# Patient Record
Sex: MALE | Race: WHITE | NOT HISPANIC OR LATINO | Employment: FULL TIME | URBAN - METROPOLITAN AREA
[De-identification: names, ages, dates, MRNs, and addresses within clinical notes are randomized per-mention and may not be internally consistent; named-entity substitution may affect disease eponyms.]

---

## 2018-07-17 ENCOUNTER — TELEPHONE (OUTPATIENT)
Dept: INFUSION THERAPY | Facility: CLINIC | Age: 63
End: 2018-07-17

## 2018-07-17 DIAGNOSIS — D50.9 IRON DEFICIENCY ANEMIA: Primary | ICD-10-CM

## 2018-07-17 NOTE — TELEPHONE ENCOUNTER
Left voicemail message for patient requesting a return call regarding scheduling appointment. NEW ORDER

## 2018-07-23 ENCOUNTER — INFUSION THERAPY VISIT (OUTPATIENT)
Dept: INFUSION THERAPY | Facility: CLINIC | Age: 63
End: 2018-07-23
Attending: FAMILY MEDICINE
Payer: COMMERCIAL

## 2018-07-23 VITALS
HEART RATE: 84 BPM | RESPIRATION RATE: 16 BRPM | TEMPERATURE: 98.2 F | DIASTOLIC BLOOD PRESSURE: 84 MMHG | SYSTOLIC BLOOD PRESSURE: 124 MMHG

## 2018-07-23 DIAGNOSIS — D50.9 IRON DEFICIENCY ANEMIA: Primary | ICD-10-CM

## 2018-07-23 PROCEDURE — 25000128 H RX IP 250 OP 636: Performed by: PHYSICIAN ASSISTANT

## 2018-07-23 PROCEDURE — 96374 THER/PROPH/DIAG INJ IV PUSH: CPT

## 2018-07-23 RX ADMIN — SODIUM CHLORIDE 250 ML: 9 INJECTION, SOLUTION INTRAVENOUS at 14:31

## 2018-07-23 RX ADMIN — FERUMOXYTOL 510 MG: 510 INJECTION INTRAVENOUS at 14:31

## 2018-07-23 NOTE — PROGRESS NOTES
Infusion Nursing Note:  Paulo Her presents today for Roccoshay.    Patient seen by provider today: No   present during visit today: Not Applicable.    Note: N/A.    Intravenous Access:  Peripheral IV placed.    Treatment Conditions:  Not Applicable.      Post Infusion Assessment:  Patient tolerated infusion without incident.  Site patent and intact, free from redness, edema or discomfort.  No evidence of extravasations.  Access discontinued per protocol.    Discharge Plan:   Discharge instructions reviewed with: Patient.  Patient and/or family verbalized understanding of discharge instructions and all questions answered.  Copy of AVS reviewed with patient and/or family.  Patient will return next Monday for next appointment.  Patient discharged in stable condition accompanied by: self.  Departure Mode: Ambulatory.    Dafne Matos RN

## 2018-07-23 NOTE — MR AVS SNAPSHOT
After Visit Summary   7/23/2018    Paulo Her    MRN: [id number]           Patient Information     Date Of Birth          1955        Visit Information        Provider Department      7/23/2018 2:00 PM SH INFUSION CHAIR 20 [facility]        Today's Diagnoses     Iron deficiency anemia    -  1       Follow-ups after your visit        Your next 10 appointments already scheduled     Jul 30, 2018  3:00 PM CDT   Level 1 with  INFUSION CHAIR 1   [facility] ([facility])    [facility]  [address]  [place] [id number] [phone]              Who to contact     If you have questions or need follow up information about today's clinic visit or your schedule please contact [facility] directly at [phone].  Normal or non-critical lab and imaging results will be communicated to you by MyChart, letter or phone within 4 business days after the clinic has received the results. If you do not hear from us within 7 days, please contact the clinic through MyChart or phone. If you have a critical or abnormal lab result, we will notify you by phone as soon as possible.  Submit refill requests through [email or web address] or call your pharmacy and they will forward the refill request to us. Please allow 3 business days for your refill to be completed.          Additional Information About Your Visit        Care EveryWhere ID     This is your Care EveryWhere ID. This could be used by other organizations to access your [facility] medical records  [id number]        Your Vitals Were     Pulse Temperature Respirations             84 98.2  F (36.8  C) (Oral) 16          Blood Pressure from Last 3 Encounters:   07/23/18 124/84   07/12/15 (!) 150/94   01/28/14 (!) 133/32    Weight from Last 3 Encounters:   05/08/12 83.9 kg (185 lb)   07/05/10 81.6 kg (180 lb)              Today,  you had the following     No orders found for display         Today's Medication Changes          These changes are accurate as of 7/23/18  3:22 PM.  If you have any questions, ask your nurse or doctor.               These medicines have changed or have updated prescriptions.        Dose/Directions    XANAX PO   This may have changed:  Another medication with the same name was removed. Continue taking this medication, and follow the directions you see here.        Dose:  0.25 mg   Take 0.25 mg by mouth daily   Refills:  0         Stop taking these medicines if you haven't already. Please contact your care team if you have questions.     METOPROLOL SUCCINATE PO                    Primary Care Provider Office Phone # Fax #    Nick Rush [phone]       [facility] [address]  [facility] [address]        Equal Access to Services     RAYNE CATES : Maulik bucko Somckayla, waaxda luqadaha, qaybta kaalmada adeegyajoseph, donnie smith . So [facility] [phone].    ATENCIÓN: Si habla español, tiene a dallas disposición servicios gratuitos de asistencia lingüística. [facility] [phone].    We comply with applicable federal civil rights laws and [place] laws. We do not discriminate on the basis of race, color, national origin, age, disability, sex, sexual orientation, or gender identity.            Thank you!     Thank you for choosing [facility]  for your care. Our goal is always to provide you with excellent care. Hearing back from our patients is one way we can continue to improve our services. Please take a few minutes to complete the written survey that you may receive in the mail after your visit with us. Thank you!             Your Updated Medication List - Protect others around you: Learn how to safely use, store and throw away your medicines at www.disposemymeds.org.          This list is accurate as of 7/23/18   3:22 PM.  Always use your most recent med list.                   Brand Name Dispense Instructions for use Diagnosis    CRESTOR PO      Take 5 mg by mouth daily Pt takes 3x per week        IRON SUPPLEMENT PO      Take 325 mg by mouth daily        LOSARTAN POTASSIUM PO      Take 12.5 mg by mouth daily        METHOCARBAMOL PO      Take 200 mg by mouth as needed for muscle spasms        METOPROLOL TARTRATE PO      Take 12.5 mg by mouth 2 times daily        milk thistle extract 140 MG Caps capsule      Take by mouth daily        omeprazole 10 MG CR capsule    priLOSEC     as needed        SIMETHICONE-80 PO      Take 80 mg by mouth 4 times daily as needed for intestinal gas        TOUJEO SOLOSTAR 300 UNIT/ML injection   Generic drug:  insulin glargine U-300      Inject 18 Units Subcutaneous At Bedtime        TRULICITY SC      Inject Subcutaneous once a week        VICODIN 5-500 MG per tablet   Generic drug:  HYDROcodone-acetaminophen      as needed        VITAMIN D (CHOLECALCIFEROL) PO      Take 1,000 Units by mouth daily        WELLBUTRIN PO      Take 50 mg by mouth daily        XANAX PO      Take 0.25 mg by mouth daily        ZOFRAN 4 MG tablet   Generic drug:  ondansetron      as needed

## 2018-07-27 ENCOUNTER — TELEPHONE (OUTPATIENT)
Dept: INFUSION THERAPY | Facility: CLINIC | Age: 63
End: 2018-07-27

## 2018-07-27 NOTE — TELEPHONE ENCOUNTER
Message left for patient Re: need new insurance information for upcoming infusion.. bcbs listed in epic is invalid

## 2018-07-30 ENCOUNTER — INFUSION THERAPY VISIT (OUTPATIENT)
Dept: INFUSION THERAPY | Facility: CLINIC | Age: 63
End: 2018-07-30
Attending: FAMILY MEDICINE
Payer: COMMERCIAL

## 2018-07-30 VITALS — TEMPERATURE: 97.9 F | DIASTOLIC BLOOD PRESSURE: 88 MMHG | RESPIRATION RATE: 16 BRPM | SYSTOLIC BLOOD PRESSURE: 150 MMHG

## 2018-07-30 DIAGNOSIS — D50.9 IRON DEFICIENCY ANEMIA: Primary | ICD-10-CM

## 2018-07-30 PROCEDURE — 96365 THER/PROPH/DIAG IV INF INIT: CPT

## 2018-07-30 PROCEDURE — 25000128 H RX IP 250 OP 636: Performed by: PHYSICIAN ASSISTANT

## 2018-07-30 RX ADMIN — FERUMOXYTOL 510 MG: 510 INJECTION INTRAVENOUS at 15:21

## 2018-07-30 RX ADMIN — SODIUM CHLORIDE 250 ML: 9 INJECTION, SOLUTION INTRAVENOUS at 15:21

## 2018-07-30 NOTE — PROGRESS NOTES
Infusion Nursing Note:  Paulo Her presents today for Feraheme.    Patient seen by provider today: No   present during visit today: Not Applicable.    Note: N/A.    Intravenous Access:  Peripheral IV placed.    Treatment Conditions:  Not Applicable.      Post Infusion Assessment:  Patient tolerated infusion without incident.  Blood return noted pre and post infusion.  Site patent and intact, free from redness, edema or discomfort.  No evidence of extravasations.  Access discontinued per protocol.    Discharge Plan:   Patient declined prescription refills.  Discharge instructions reviewed with: Patient.  Patient verbalized understanding of discharge instructions and all questions answered.  Copy of AVS reviewed with patient.  Patient will return as scheduled for next appointment.  Patient discharged in stable condition accompanied by: self.  Departure Mode: Ambulatory.    Savanah Magana RN

## 2019-10-22 ENCOUNTER — TRANSFERRED RECORDS (OUTPATIENT)
Dept: HEALTH INFORMATION MANAGEMENT | Facility: CLINIC | Age: 64
End: 2019-10-22

## 2019-10-31 RX ORDER — HEPARIN SODIUM (PORCINE) LOCK FLUSH IV SOLN 100 UNIT/ML 100 UNIT/ML
5 SOLUTION INTRAVENOUS
Status: CANCELLED | OUTPATIENT
Start: 2019-11-04

## 2019-10-31 RX ORDER — HEPARIN SODIUM,PORCINE 10 UNIT/ML
5 VIAL (ML) INTRAVENOUS
Status: CANCELLED | OUTPATIENT
Start: 2019-11-04

## 2019-11-04 ENCOUNTER — INFUSION THERAPY VISIT (OUTPATIENT)
Dept: INFUSION THERAPY | Facility: CLINIC | Age: 64
End: 2019-11-04
Attending: FAMILY MEDICINE
Payer: COMMERCIAL

## 2019-11-04 VITALS
RESPIRATION RATE: 18 BRPM | DIASTOLIC BLOOD PRESSURE: 78 MMHG | TEMPERATURE: 97.8 F | HEART RATE: 96 BPM | SYSTOLIC BLOOD PRESSURE: 124 MMHG

## 2019-11-04 DIAGNOSIS — D50.9 IRON DEFICIENCY ANEMIA: Primary | ICD-10-CM

## 2019-11-04 PROCEDURE — 25800030 ZZH RX IP 258 OP 636: Performed by: PHYSICIAN ASSISTANT

## 2019-11-04 PROCEDURE — 25000128 H RX IP 250 OP 636: Performed by: PHYSICIAN ASSISTANT

## 2019-11-04 PROCEDURE — 96365 THER/PROPH/DIAG IV INF INIT: CPT

## 2019-11-04 RX ORDER — HEPARIN SODIUM,PORCINE 10 UNIT/ML
5 VIAL (ML) INTRAVENOUS
Status: CANCELLED | OUTPATIENT
Start: 2019-11-11

## 2019-11-04 RX ORDER — HEPARIN SODIUM (PORCINE) LOCK FLUSH IV SOLN 100 UNIT/ML 100 UNIT/ML
5 SOLUTION INTRAVENOUS
Status: CANCELLED | OUTPATIENT
Start: 2019-11-11

## 2019-11-04 RX ADMIN — FERUMOXYTOL 510 MG: 510 INJECTION INTRAVENOUS at 14:16

## 2019-11-04 ASSESSMENT — PAIN SCALES - GENERAL: PAINLEVEL: NO PAIN (0)

## 2019-11-04 NOTE — PROGRESS NOTES
Infusion Nursing Note:  Paulo Her presents today for feraheme.    Patient seen by provider today: No   present during visit today: Not Applicable.    Note: N/A.    Intravenous Access:  Peripheral IV placed.    Treatment Conditions:  Not Applicable.      Post Infusion Assessment:  Patient tolerated infusion without incident.  Blood return noted pre and post infusion.  Site patent and intact, free from redness, edema or discomfort.  No evidence of extravasations.  Access discontinued per protocol.       Discharge Plan:   Discharge instructions reviewed with: Patient.  Patient and/or family verbalized understanding of discharge instructions and all questions answered.  Patient discharged in stable condition accompanied by: self.  Departure Mode: Ambulatory.    Misty Lobato RN

## 2019-11-11 ENCOUNTER — INFUSION THERAPY VISIT (OUTPATIENT)
Dept: INFUSION THERAPY | Facility: CLINIC | Age: 64
End: 2019-11-11
Attending: FAMILY MEDICINE
Payer: COMMERCIAL

## 2019-11-11 VITALS — SYSTOLIC BLOOD PRESSURE: 123 MMHG | HEART RATE: 83 BPM | DIASTOLIC BLOOD PRESSURE: 83 MMHG | RESPIRATION RATE: 16 BRPM

## 2019-11-11 DIAGNOSIS — D50.9 IRON DEFICIENCY ANEMIA: Primary | ICD-10-CM

## 2019-11-11 PROCEDURE — 25000128 H RX IP 250 OP 636: Performed by: PHYSICIAN ASSISTANT

## 2019-11-11 PROCEDURE — 25800030 ZZH RX IP 258 OP 636: Performed by: PHYSICIAN ASSISTANT

## 2019-11-11 PROCEDURE — 96365 THER/PROPH/DIAG IV INF INIT: CPT

## 2019-11-11 RX ORDER — HEPARIN SODIUM,PORCINE 10 UNIT/ML
5 VIAL (ML) INTRAVENOUS
Status: CANCELLED | OUTPATIENT
Start: 2019-11-18

## 2019-11-11 RX ORDER — HEPARIN SODIUM (PORCINE) LOCK FLUSH IV SOLN 100 UNIT/ML 100 UNIT/ML
5 SOLUTION INTRAVENOUS
Status: CANCELLED | OUTPATIENT
Start: 2019-11-18

## 2019-11-11 RX ADMIN — SODIUM CHLORIDE 250 ML: 9 INJECTION, SOLUTION INTRAVENOUS at 14:22

## 2019-11-11 RX ADMIN — FERUMOXYTOL 510 MG: 510 INJECTION INTRAVENOUS at 14:22

## 2019-11-11 NOTE — PROGRESS NOTES
Infusion Nursing Note:  Paulo Her presents today for Feraheme.    Patient seen by provider today: No   present during visit today: Not Applicable.    Note: N/A.    Intravenous Access:  Peripheral IV placed.    Treatment Conditions:  Not Applicable.      Post Infusion Assessment:  Patient tolerated infusion without incident.  Patient observed for 30 minutes post Feraheme per protocol.  Blood return noted pre and post infusion.  Site patent and intact, free from redness, edema or discomfort.  No evidence of extravasations.  Access discontinued per protocol.       Discharge Plan:   Discharge instructions reviewed with: Patient.  Patient and/or family verbalized understanding of discharge instructions and all questions answered.  Copy of AVS reviewed with patient and/or family.  Patient will return PRN for next appointment.  Patient discharged in stable condition accompanied by: self.  Departure Mode: Ambulatory.    Alfonzo Roman RN

## 2020-03-15 ENCOUNTER — HEALTH MAINTENANCE LETTER (OUTPATIENT)
Age: 65
End: 2020-03-15

## 2020-05-16 ENCOUNTER — APPOINTMENT (OUTPATIENT)
Dept: CT IMAGING | Facility: CLINIC | Age: 65
End: 2020-05-16
Attending: NURSE PRACTITIONER
Payer: MEDICARE

## 2020-05-16 ENCOUNTER — HOSPITAL ENCOUNTER (EMERGENCY)
Facility: CLINIC | Age: 65
Discharge: HOME | End: 2020-05-16
Attending: NURSE PRACTITIONER | Admitting: NURSE PRACTITIONER
Payer: MEDICARE

## 2020-05-16 ENCOUNTER — APPOINTMENT (OUTPATIENT)
Dept: GENERAL RADIOLOGY | Facility: CLINIC | Age: 65
End: 2020-05-16
Attending: NURSE PRACTITIONER
Payer: MEDICARE

## 2020-05-16 VITALS
BODY MASS INDEX: 30.1 KG/M2 | DIASTOLIC BLOOD PRESSURE: 98 MMHG | TEMPERATURE: 97 F | SYSTOLIC BLOOD PRESSURE: 146 MMHG | HEIGHT: 71 IN | OXYGEN SATURATION: 100 % | WEIGHT: 215 LBS | RESPIRATION RATE: 16 BRPM

## 2020-05-16 DIAGNOSIS — S09.90XA INJURY OF HEAD, INITIAL ENCOUNTER: ICD-10-CM

## 2020-05-16 DIAGNOSIS — S01.01XA LACERATION OF SCALP, INITIAL ENCOUNTER: ICD-10-CM
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
## 2020-05-16 DIAGNOSIS — M54.2 NECK PAIN: ICD-10-CM

## 2020-05-16 PROCEDURE — 70450 CT HEAD/BRAIN W/O DYE: CPT

## 2020-05-16 PROCEDURE — 99284 EMERGENCY DEPT VISIT MOD MDM: CPT | Mod: 25

## 2020-05-16 PROCEDURE — 12002 RPR S/N/AX/GEN/TRNK2.6-7.5CM: CPT

## 2020-05-16 PROCEDURE — 72040 X-RAY EXAM NECK SPINE 2-3 VW: CPT

## 2020-05-16 ASSESSMENT — ENCOUNTER SYMPTOMS
VOMITING: 0
ARTHRALGIAS: 1
MYALGIAS: 1
WOUND: 1

## 2020-05-16 ASSESSMENT — MIFFLIN-ST. JEOR: SCORE: 1782.36

## 2020-05-16 NOTE — DISCHARGE INSTRUCTIONS
Staples out in 7 days. Return for signs or symptoms of infection such as: fever, increased redness, heat to touch, increased pain or pus-like drainage.      Discharge Instructions  Head Injury    You have been seen today for a head injury. Your evaluation included a history and physical examination. You may have had a CT (CAT) scan performed, though most head injuries do not require a scan. Based on this evaluation, your provider today does not feel that your head injury is serious.    Generally, every Emergency Department visit should have a follow-up clinic visit with either a primary or a specialty clinic/provider. Please follow-up as instructed by your emergency provider today.  Return to the Emergency Department if:  You are confused or you are not acting right.  Your headache gets worse or you start to have a really bad headache even with your recommended treatment plan.  You vomit (throw up) more than once.  You have a seizure.  You have trouble walking.  You have weakness or paralysis (cannot move) in an arm or a leg.  You have blood or fluid coming from your ears or nose.  You have new symptoms or anything that worries you.    Sleeping:  It is okay for you to sleep, but someone should wake you up if instructed by your provider, and someone should check on you at your usual time to wake up.     Activity:  Do not drive for at least 24 hours.  Do not drive if you have dizzy spells or trouble concentrating, or remembering things.  Do not return to any contact sports until cleared by your regular provider.     MORE INFORMATION:    Concussion:  A concussion is a minor head injury that may cause temporary problems with the way the brain works. Although concussions are important, they are generally not an emergency or a reason that a person needs to be hospitalized. Some concussion symptoms include confusion, amnesia (forgetful), nausea (sick to your stomach) and vomiting (throwing up), dizziness, fatigue, memory  or concentration problems, irritability and sleep problems. For most people, concussions are mild and temporary but some will have more severe and persistent symptoms that require on-going care and treatment.  CT Scans: Your evaluation today may have included a CT scan (CAT scan) to look for things like bleeding or a skull fracture (broken bone).  CT scans involve radiation and too many CT scans can cause serious health problems like cancer, especially in children.  Because of this, your provider may not have ordered a CT scan today if they think you are at low risk for a serious or life threatening problem.    If you were given a prescription for medicine here today, be sure to read all of the information (including the package insert) that comes with your prescription.  This will include important information about the medicine, its side effects, and any warnings that you need to know about.  The pharmacist who fills the prescription can provide more information and answer questions you may have about the medicine.  If you have questions or concerns that the pharmacist cannot address, please call or return to the Emergency Department.     Remember that you can always come back to the Emergency Department if you are not able to see your regular provider in the amount of time listed above, if you get any new symptoms, or if there is anything that worries you.

## 2020-05-16 NOTE — ED NOTES
Patient had midline neck tenderness when assessing in triage.  Attempted to apply c collar, patient refused due to pain.  Explained to patient in length the importance of the c collar and could provide a gauze over patient's wound on posterior head.  Patient again refused c collar.

## 2020-05-16 NOTE — ED AVS SNAPSHOT
Emergency Department  [address] [facility] [address]  Phone:  [phone]  Fax:  [phone]                                    Paulo Her   MRN: [id number]    Department:   Emergency Department   Date of Visit:  5/16/2020           After Visit Summary Signature Page    I have received my discharge instructions, and my questions have been answered. I have discussed any challenges I see with this plan with the nurse or doctor.    ..........................................................................................................................................  Patient/Patient Representative Signature      ..........................................................................................................................................  Patient Representative Print Name and Relationship to Patient    ..................................................               ................................................  Date                                   Time    ..........................................................................................................................................  Reviewed by Signature/Title    ...................................................              ..............................................  Date                                               Time          22EPIC Rev 08/18

## 2020-05-16 NOTE — ED TRIAGE NOTES
Fell approx 5 feet off a ladder.  States head scraped against the brick as he fell.  Bleeding to back of head.  Unsure which side he landed on.  Ambulatory into ED.  No thinners.  No LOC.  States feeling dizzy and vision seems blurry.

## 2020-05-16 NOTE — ED PROVIDER NOTES
"  History     Chief Complaint:  Fall      HPI   Paulo Her is a 65 year old male who presents for the evaluation of fall. The patient reports that just prior to his arrival to the ED he was working on his house and fell five feet to the ground off a ladder landing on his back. The patient describes that one of the legs of the ladder gave way causing him to fall and that he also hit the back of his head on the brick window ledge on the exterior of his house. He notes that he is experiencing worsened neck pain from his baseline and a sensation of soreness in his hands that he describes as feeling similar to a sprain. He states that he has a history of neck pain and has received steroid injections for this in the past. Patient also endorses his vision being \"a little off,\" but denies blurred and double vision. No swelling, bruising, or deformity of the hands or wrist. No pain in the hips knees, elbows, wrists, shoulders. Patient does have a cute to the back of his scalp. The patient denies syncope, vomiting, and other issues. Tetanus is up to date. 1/31/2019.     Allergies:  Cipro  Penicillin G     Medications:    Xanax  Wellbutrin  Trulicity  Losartan  Methocarbamol  Metoprolol  Omeprazole  Crestor  Simethicone  Vitamin D    Past Medical History:    Iron deficiency anemia   Type II diabetes  Aspirin intolerance  RBBB  Cervical radiculopathy  Parotiditis  Chronic abdominal pain  GERD  Bronchitis  Arteriosclerotic cardiovascular disease  Hypertension  Cirrhosis  Hepatitis C  Anxiety     Past Surgical History:    History reviewed. No pertinent surgical history.     Family History:    History reviewed. No pertinent family history.      Social History:  Presents to the ED via ambulance.   Smoking status: Current every day smoker, PPD: 0.25  Alcohol use: Yes  Drug use: No  PCP: Nick Rush   Marital Status:   [2]     Review of Systems   Gastrointestinal: Negative for vomiting.   Musculoskeletal: " "Positive for arthralgias and myalgias.   Skin: Positive for wound.   Neurological: Negative for syncope.   All other systems reviewed and are negative.        Physical Exam     Patient Vitals for the past 24 hrs:   BP Temp Temp src Heart Rate Resp SpO2 Height Weight   05/16/20 1424 (!) 146/98 97  F (36.1  C) Temporal 88 14 96 % 1.803 m (5' 11\") 97.5 kg (215 lb)      Physical Exam  Physical Exam   Constitutional: Pt appears well-developed and well-nourished.  Head: Posterior scalp laceration. Head moves freely with normal range of motion. No battles signs. No Racoons eyes.   ENT: Oropharynx is clear and moist. Nose with no deformity. No hemotympanum.  Eyes: Conjunctivae pink. EOMs intact. Pupils are equal, round, and reactive to light.  Neck: Normal range of motion. Mild midline C Spine tenderness, no step-off or crepitus. No edema.   Cardiovascular: Regular rate and rhythm. Normal heart sounds. No concerning  murmur heard. Intact distal pulses: radial pulses 2+ on the right, 2+ on the left.   Pulmonary/Chest: No respiratory distress.  Breath sounds normal. No decreased breath sounds. No wheezes. No rhonchi. No rales. No chest wall tenderness or crepitus.    Abdominal: Soft. Non-tender. No rebound, no guarding.   Musculoskeletal: No edema. No tenderness. Distal capillary refill and sensation intact. No swelling, bruising, or deformity of the hands or wrist. He notes mild tenderness to bilateral palms of the hands.   Neurological: Oriented to person, place, and time. No focal deficits.   Skin: Skin is warm. 4 cm laceration the back of the scalp.     Emergency Department Course   Imaging:  Radiographic findings were communicated with the patient who voiced understanding of the findings.  Cervical Spine XR, 2-3 Views  IMPRESSION: There is normal alignment of the cervical vertebrae;  however, there is straightening of normal cervical lordosis. Vertebral  body heights of the cervical spine are normal. " Craniocervical  alignment is normal. There is no evidence for fracture of the cervical  spine. There is degenerative endplate spurring at the C5-C6 and C6-C7  levels. There is no prevertebral soft tissue swelling.  As read by Radiology.    Head CT w/o Contrast   IMPRESSION: Diffuse cerebral volume loss and cerebral white matter  changes consistent with chronic small vessel ischemic disease. No  evidence for acute intracranial pathology.  As read by Radiology.    Procedures:    Laceration Repair        LACERATION:  A simple clean 4 cm laceration.      LOCATION:  Back of scalp       FUNCTION:  Distally sensation are intact.      ANESTHESIA:  Local using 1% lidocaine with Epi total of 3 mLs      PREPARATION:  Irrigation with Normal Saline and Shur Clens      DEBRIDEMENT:  no debridement      CLOSURE:  Wound was closed with 5 Staples    Emergency Department Course:  Past medical records, nursing notes, and vitals reviewed.  1440: I performed an exam of the patient and obtained history, as documented above.     IV inserted and blood drawn.     The patient was sent for a head CT and cervical spine x ray while in the emergency department, findings above.    1546: I rechecked the patient. Explained findings to patient. Patient treated with local anesthesia.     1630: I rechecked the patient. Explained findings to patient. Laceration repair procedure performed.     Findings and plan explained to the Patient. Patient discharged home with instructions regarding supportive care, medications, and reasons to return. The importance of close follow-up was reviewed.       Impression & Plan    Medical Decision Making:  Paulo Her is a 65 year old male who fell off a ladder about 5 feet after a leg on the ladder failed. No LOC. No emesis. Normal neurologic exam. He is not anticoagulated. Head CT is negative. C spine Xray is negative. No extremity deformity. Ambulates without pain. Scalp laceration repaired with 5 staples. We  discussed wound care, head injury precautions and need for clinic follow up for recheck. He is amenable to plan.       Diagnosis:    ICD-10-CM    1. Injury of head, initial encounter  S09.90XA    2. Laceration of scalp, initial encounter  S01.01XA    3. Fall from ladder, initial encounter  W11.XXXA    4. Neck pain  M54.2     acute on chronic       Disposition:  Discharged to home.    Scribe Disclosure:  I, Paulo Quarles, am serving as a scribe at 2:34 PM on 5/16/2020 to document services personally performed by Radha Titus APRN CNP based on my observations and the provider's statements to me.      Paulo Quarles  5/16/2020    EMERGENCY DEPARTMENT       Radha Titus APRN CNP  05/16/20 2043

## 2021-03-14 ENCOUNTER — AMBULATORY - HEALTHEAST (OUTPATIENT)
Dept: SURGERY | Facility: AMBULATORY SURGERY CENTER | Age: 66
End: 2021-03-14

## 2021-03-14 DIAGNOSIS — Z11.59 ENCOUNTER FOR SCREENING FOR OTHER VIRAL DISEASES: ICD-10-CM

## 2021-04-03 DIAGNOSIS — Z11.59 ENCOUNTER FOR SCREENING FOR OTHER VIRAL DISEASES: Primary | ICD-10-CM

## 2021-04-05 DIAGNOSIS — Z11.59 ENCOUNTER FOR SCREENING FOR OTHER VIRAL DISEASES: ICD-10-CM

## 2021-04-05 LAB
SARS-COV-2 RNA RESP QL NAA+PROBE: NORMAL
SPECIMEN SOURCE: NORMAL

## 2021-04-05 PROCEDURE — U0005 INFEC AGEN DETEC AMPLI PROBE: HCPCS | Performed by: PHYSICAL MEDICINE & REHABILITATION

## 2021-04-05 PROCEDURE — U0003 INFECTIOUS AGENT DETECTION BY NUCLEIC ACID (DNA OR RNA); SEVERE ACUTE RESPIRATORY SYNDROME CORONAVIRUS 2 (SARS-COV-2) (CORONAVIRUS DISEASE [COVID-19]), AMPLIFIED PROBE TECHNIQUE, MAKING USE OF HIGH THROUGHPUT TECHNOLOGIES AS DESCRIBED BY CMS-2020-01-R: HCPCS | Performed by: PHYSICAL MEDICINE & REHABILITATION

## 2021-04-06 LAB
LABORATORY COMMENT REPORT: NORMAL
SARS-COV-2 RNA RESP QL NAA+PROBE: NEGATIVE
SPECIMEN SOURCE: NORMAL

## 2021-04-06 ASSESSMENT — MIFFLIN-ST. JEOR
SCORE: 1769.42
SCORE: 1769.42

## 2021-04-07 ENCOUNTER — COMMUNICATION - HEALTHEAST (OUTPATIENT)
Dept: SCHEDULING | Facility: CLINIC | Age: 66
End: 2021-04-07

## 2021-04-08 ENCOUNTER — SURGERY - HEALTHEAST (OUTPATIENT)
Dept: SURGERY | Facility: AMBULATORY SURGERY CENTER | Age: 66
End: 2021-04-08

## 2021-04-08 ENCOUNTER — HOSPITAL ENCOUNTER (OUTPATIENT)
Dept: SURGERY | Facility: AMBULATORY SURGERY CENTER | Age: 66
Discharge: HOME | End: 2021-04-08
Attending: PHYSICAL MEDICINE & REHABILITATION | Admitting: PHYSICAL MEDICINE & REHABILITATION
Payer: COMMERCIAL

## 2021-05-09 ENCOUNTER — HEALTH MAINTENANCE LETTER (OUTPATIENT)
Age: 66
End: 2021-05-09

## 2021-06-05 VITALS
WEIGHT: 215 LBS | HEIGHT: 71 IN | HEIGHT: 71 IN | BODY MASS INDEX: 30.1 KG/M2 | WEIGHT: 215 LBS | BODY MASS INDEX: 30.1 KG/M2

## 2021-06-16 NOTE — PROCEDURES
"CERVICAL TRANSFORAMINAL EPIDURAL STEROID INJECTIONS WITH FLUOROSCOPIC GUIDANCE      Pre Procedure Diagnosis:  Neck pain, Cervical radiculitis  Post Procedure Diagnosis:  Same  Procedure Performed: Cervical Transforaminal Epidural Steroid Injection with Fluoroscopic Guidance  Clinical Scenario:  As per office notes  Anesthesia/Fluids:  As per intra-procedure documentation  Vital Signs:  As per intra-procedure documentation  Level Injected:  C6-C7  Side Injected:  Right  CC:        The patient has had other conservative treatment but wishes to pursue spine injections.  Alternative treatments to spine injections were discussed with the patient.  The procedure of cervical transforaminal epidural steroid injection was discussed in detail, using a spine model to demonstrate.  The patient had the opportunity to directly ask the physician questions regarding the procedure and the questions were answered prior to the consent form being presented.  The risks of the procedure, including but not limited to:  Neck pain/soreness, infection, bleeding, permanent nerve damage/injury, paralysis, stroke, allergic reaction,  worsening of neck/arm pain or no change in pain. The patient elected to proceed and signed informed consent.  His pain today was worse on the right side, hence right instead of left pursued.    The patient denies any symptoms of an active infection and denies taking antibiotics. The patient denies taking any prescription blood thinning medications. The patient denies any allergies to iodine or iodine contrast.     The patient was positioned in the supine position on the stretcher block.  A procedural pause was performed to verify patient identify, site of injection and side of injection.  The Right side of the neck was prepped in the usual sterile fashion.  The C-arm was positioned so that an anterior oblique view of the C6-C7 foramen was visualized.  A 3.5\", 25 guage spinal needle was inserted down to the " "posterior-inferior aspect of the foramen. The anterior foramen was avoided due to its proximity to the vertebral artery. Under AP visualization, the needle was advanced so it did not go beyond the \"6 o'clock\" position of the lateral masses. During needle advancement, the oblique (foraminal) view was also observed to ensure that the needle tip did not go towards the anterior aspect of the foramen.   Anterior views also were used to confirm placement.    After negative aspiration for blood, a 1 ml volume of Omnipaque-300 was injected and flow of contrast was noted to flow within the epidural space. Real-time standard and digital subtraction fluoroscopy was utilized and confirmed that no vascular uptake was seen.   A 1 mL solution of 1% lidocaine was injected as a test dose.  The patient did not experience any dysguesia, lightheadedness/dizziness, pain/paresthesias in the opposite upper extremity or in the legs.   One minute following the lidocaine test dose, the patient still reported no symptoms.  Subsequently, 1 mL of 15 mgs of Dexamethasone was injected.  The needle was removed.      The patient tolerated the procedure well.    After a period of observation, the patient was discharged.  The patient was instructed to call the spine clinic if any questions/concerns arise from the procedure.             "

## 2021-12-23 NOTE — MR AVS SNAPSHOT
After Visit Summary   7/30/2018    Paulo Her    MRN: [id number]           Patient Information     Date Of Birth          1955        Visit Information        Provider Department      7/30/2018 3:00 PM  INFUSION CHAIR 1 [facility]        Today's Diagnoses     Iron deficiency anemia    -  1       Follow-ups after your visit        Who to contact     If you have questions or need follow up information about today's clinic visit or your schedule please contact [facility] directly at [phone].  Normal or non-critical lab and imaging results will be communicated to you by MyChart, letter or phone within 4 business days after the clinic has received the results. If you do not hear from us within 7 days, please contact the clinic through MyChart or phone. If you have a critical or abnormal lab result, we will notify you by phone as soon as possible.  Submit refill requests through [employer] or call your pharmacy and they will forward the refill request to us. Please allow 3 business days for your refill to be completed.          Additional Information About Your Visit        Care EveryWhere ID     This is your Care EveryWhere ID. This could be used by other organizations to access your [place] medical records  [id number]        Your Vitals Were     Temperature Respirations                97.9  F (36.6  C) (Oral) 16           Blood Pressure from Last 3 Encounters:   07/30/18 150/88   07/23/18 124/84   07/12/15 (!) 150/94    Weight from Last 3 Encounters:   05/08/12 83.9 kg (185 lb)   07/05/10 81.6 kg (180 lb)              Today, you had the following     No orders found for display       Primary Care Provider Office Phone # Fax #    Nick Rush -[phone]       [facility] [address] [place] [address]        Equal Access to Services     RAYNE CATES AH: Maulik Pierce,  "Florence Gaming PA-C        Edward Curtis is a 80 waolegda lujanetteeliezer, qaybta kawiley hill, donnie gasparkeri ah. So [facility] [phone].    ATENCIÓN: Si jose ela stella, tiene a dallas disposición servicios gratuitos de asistencia lingüística. Deonna al [phone].    We comply with applicable federal civil rights laws and [place] laws. We do not discriminate on the basis of race, color, national origin, age, disability, sex, sexual orientation, or gender identity.            Thank you!     Thank you for choosing [facility]  for your care. Our goal is always to provide you with excellent care. Hearing back from our patients is one way we can continue to improve our services. Please take a few minutes to complete the written survey that you may receive in the mail after your visit with us. Thank you!             Your Updated Medication List - Protect others around you: Learn how to safely use, store and throw away your medicines at www.disposemymeds.org.          This list is accurate as of 7/30/18  3:51 PM.  Always use your most recent med list.                   Brand Name Dispense Instructions for use Diagnosis    CRESTOR PO      Take 5 mg by mouth daily Pt takes 3x per week        IRON SUPPLEMENT PO      Take 325 mg by mouth daily        LOSARTAN POTASSIUM PO      Take 12.5 mg by mouth daily        METHOCARBAMOL PO      Take 200 mg by mouth as needed for muscle spasms        METOPROLOL TARTRATE PO      Take 12.5 mg by mouth 2 times daily        milk thistle extract 140 MG Caps capsule      Take by mouth daily        omeprazole 10 MG CR capsule    priLOSEC     as needed        SIMETHICONE-80 PO      Take 80 mg by mouth 4 times daily as needed for intestinal gas        TOUJEO SOLOSTAR 300 UNIT/ML injection   Generic drug:  insulin glargine U-300      Inject 18 Units Subcutaneous At Bedtime        TRULICITY SC      Inject Subcutaneous once a week        VICODIN 5-500 MG per tablet   Generic drug:   HYDROcodone-acetaminophen      as needed        VITAMIN D (CHOLECALCIFEROL) PO      Take 1,000 Units by mouth daily        WELLBUTRIN PO      Take 50 mg by mouth daily        XANAX PO      Take 0.25 mg by mouth daily        ZOFRAN 4 MG tablet   Generic drug:  ondansetron      as needed

## 2022-02-02 ENCOUNTER — TELEPHONE (OUTPATIENT)
Dept: UROLOGY | Facility: CLINIC | Age: 67
End: 2022-02-02
Payer: COMMERCIAL

## 2022-02-02 NOTE — TELEPHONE ENCOUNTER
CAROLM for patient to schedule a Video visit (new) on 2-9 at 2:00 with Dr. Valdez for cyst on kidney. Okay to schedule.

## 2022-02-03 ENCOUNTER — TELEPHONE (OUTPATIENT)
Dept: UROLOGY | Facility: CLINIC | Age: 67
End: 2022-02-03
Payer: COMMERCIAL

## 2022-02-03 NOTE — TELEPHONE ENCOUNTER
LVM for patient to schedule a New video visit with Dr. Valdez on 2-9 at 2:00. Okay to schedule per Tamra.

## 2022-06-05 ENCOUNTER — HEALTH MAINTENANCE LETTER (OUTPATIENT)
Age: 67
End: 2022-06-05

## 2022-10-15 ENCOUNTER — HEALTH MAINTENANCE LETTER (OUTPATIENT)
Age: 67
End: 2022-10-15

## 2022-11-02 ENCOUNTER — ANCILLARY PROCEDURE (OUTPATIENT)
Dept: CT IMAGING | Facility: CLINIC | Age: 67
End: 2022-11-02
Attending: INTERNAL MEDICINE
Payer: COMMERCIAL

## 2022-11-02 DIAGNOSIS — R93.89 ABNORMAL FINDING ON IMAGING: ICD-10-CM

## 2022-11-02 LAB
CREAT BLD-MCNC: 1.3 MG/DL (ref 0.7–1.3)
GFR SERPL CREATININE-BSD FRML MDRD: 60 ML/MIN/1.73M2

## 2022-11-02 PROCEDURE — 255N000002 HC RX 255 OP 636: Performed by: INTERNAL MEDICINE

## 2022-11-02 PROCEDURE — 74177 CT ABD & PELVIS W/CONTRAST: CPT

## 2022-11-02 PROCEDURE — 82565 ASSAY OF CREATININE: CPT

## 2022-11-02 RX ADMIN — IOHEXOL 100 ML: 350 INJECTION, SOLUTION INTRAVENOUS at 12:13

## 2023-08-20 ENCOUNTER — HEALTH MAINTENANCE LETTER (OUTPATIENT)
Age: 68
End: 2023-08-20

## 2024-08-04 ENCOUNTER — HEALTH MAINTENANCE LETTER (OUTPATIENT)
Age: 69
End: 2024-08-04

## 2024-09-26 ENCOUNTER — HOSPITAL ENCOUNTER (EMERGENCY)
Facility: CLINIC | Age: 69
Discharge: HOME | End: 2024-09-26
Attending: EMERGENCY MEDICINE | Admitting: EMERGENCY MEDICINE
Payer: COMMERCIAL

## 2024-09-26 ENCOUNTER — APPOINTMENT (OUTPATIENT)
Dept: CT IMAGING | Facility: CLINIC | Age: 69
End: 2024-09-26
Attending: EMERGENCY MEDICINE
Payer: COMMERCIAL

## 2024-09-26 VITALS
DIASTOLIC BLOOD PRESSURE: 73 MMHG | HEART RATE: 78 BPM | OXYGEN SATURATION: 98 % | SYSTOLIC BLOOD PRESSURE: 120 MMHG | TEMPERATURE: 97.8 F | RESPIRATION RATE: 16 BRPM

## 2024-09-26 DIAGNOSIS — R10.9 RIGHT SIDED ABDOMINAL PAIN: ICD-10-CM

## 2024-09-26 LAB
ALBUMIN SERPL BCG-MCNC: 4.2 G/DL (ref 3.5–5.2)
ALP SERPL-CCNC: 85 U/L (ref 40–150)
ALT SERPL W P-5'-P-CCNC: 17 U/L (ref 0–70)
ANION GAP SERPL CALCULATED.3IONS-SCNC: 8 MMOL/L (ref 7–15)
AST SERPL W P-5'-P-CCNC: 21 U/L (ref 0–45)
BASOPHILS # BLD AUTO: 0.1 10E3/UL (ref 0–0.2)
BASOPHILS NFR BLD AUTO: 1 %
BILIRUB SERPL-MCNC: 0.5 MG/DL
BUN SERPL-MCNC: 20.8 MG/DL (ref 8–23)
CALCIUM SERPL-MCNC: 9.6 MG/DL (ref 8.8–10.4)
CHLORIDE SERPL-SCNC: 105 MMOL/L (ref 98–107)
CREAT SERPL-MCNC: 1.28 MG/DL (ref 0.67–1.17)
CRP SERPL-MCNC: 3.5 MG/L
EGFRCR SERPLBLD CKD-EPI 2021: 61 ML/MIN/1.73M2
EOSINOPHIL # BLD AUTO: 0.3 10E3/UL (ref 0–0.7)
EOSINOPHIL NFR BLD AUTO: 3 %
ERYTHROCYTE [DISTWIDTH] IN BLOOD BY AUTOMATED COUNT: 12.8 % (ref 10–15)
GLUCOSE SERPL-MCNC: 101 MG/DL (ref 70–99)
HCO3 SERPL-SCNC: 28 MMOL/L (ref 22–29)
HCT VFR BLD AUTO: 47.2 % (ref 40–53)
HGB BLD-MCNC: 16 G/DL (ref 13.3–17.7)
IMM GRANULOCYTES # BLD: 0.1 10E3/UL
IMM GRANULOCYTES NFR BLD: 1 %
LACTATE SERPL-SCNC: 1.1 MMOL/L (ref 0.7–2)
LIPASE SERPL-CCNC: 91 U/L (ref 13–60)
LYMPHOCYTES # BLD AUTO: 1.4 10E3/UL (ref 0.8–5.3)
LYMPHOCYTES NFR BLD AUTO: 16 %
MCH RBC QN AUTO: 32.1 PG (ref 26.5–33)
MCHC RBC AUTO-ENTMCNC: 33.9 G/DL (ref 31.5–36.5)
MCV RBC AUTO: 95 FL (ref 78–100)
MONOCYTES # BLD AUTO: 0.8 10E3/UL (ref 0–1.3)
MONOCYTES NFR BLD AUTO: 9 %
NEUTROPHILS # BLD AUTO: 5.9 10E3/UL (ref 1.6–8.3)
NEUTROPHILS NFR BLD AUTO: 70 %
NRBC # BLD AUTO: 0 10E3/UL
NRBC BLD AUTO-RTO: 0 /100
PLATELET # BLD AUTO: 187 10E3/UL (ref 150–450)
POTASSIUM SERPL-SCNC: 4.1 MMOL/L (ref 3.4–5.3)
PROT SERPL-MCNC: 7.3 G/DL (ref 6.4–8.3)
RBC # BLD AUTO: 4.99 10E6/UL (ref 4.4–5.9)
SODIUM SERPL-SCNC: 141 MMOL/L (ref 135–145)
WBC # BLD AUTO: 8.5 10E3/UL (ref 4–11)

## 2024-09-26 PROCEDURE — 36415 COLL VENOUS BLD VENIPUNCTURE: CPT | Performed by: EMERGENCY MEDICINE

## 2024-09-26 PROCEDURE — 80053 COMPREHEN METABOLIC PANEL: CPT | Performed by: EMERGENCY MEDICINE

## 2024-09-26 PROCEDURE — 99284 EMERGENCY DEPT VISIT MOD MDM: CPT | Mod: 25

## 2024-09-26 PROCEDURE — 83605 ASSAY OF LACTIC ACID: CPT | Performed by: EMERGENCY MEDICINE

## 2024-09-26 PROCEDURE — 74176 CT ABD & PELVIS W/O CONTRAST: CPT

## 2024-09-26 PROCEDURE — 85025 COMPLETE CBC W/AUTO DIFF WBC: CPT | Performed by: EMERGENCY MEDICINE

## 2024-09-26 PROCEDURE — 86140 C-REACTIVE PROTEIN: CPT | Performed by: EMERGENCY MEDICINE

## 2024-09-26 PROCEDURE — 83690 ASSAY OF LIPASE: CPT | Performed by: EMERGENCY MEDICINE

## 2024-09-26 ASSESSMENT — ACTIVITIES OF DAILY LIVING (ADL)
ADLS_ACUITY_SCORE: 35

## 2024-09-26 ASSESSMENT — COLUMBIA-SUICIDE SEVERITY RATING SCALE - C-SSRS
2. HAVE YOU ACTUALLY HAD ANY THOUGHTS OF KILLING YOURSELF IN THE PAST MONTH?: NO
1. IN THE PAST MONTH, HAVE YOU WISHED YOU WERE DEAD OR WISHED YOU COULD GO TO SLEEP AND NOT WAKE UP?: NO
6. HAVE YOU EVER DONE ANYTHING, STARTED TO DO ANYTHING, OR PREPARED TO DO ANYTHING TO END YOUR LIFE?: NO

## 2024-09-26 NOTE — ED TRIAGE NOTES
"Pt arrives with R-sided abdominal pain, sudden onset approximately 1430 today. Pt reports pain is sharp and \"feels like I got stabbed\". Pain not relieved with positional changes. PCP recommended ED visit. Denies n/v/d.        "

## 2024-09-26 NOTE — ED PROVIDER NOTES
Emergency Department Note      History of Present Illness     Chief Complaint   Abdominal Pain      HPI   Paulo Her is a 69 year old male who presents with abdominal pain. Patient was in a work meeting with his wife at the breakfast table at 1430 today when he felt the sudden onset of right-sided abdominal pain. He was unable to respond to any questions and noted the pain was about a 10/10. Kai notes it feels like he was stabbed with a  knife. He says the pain feels different from anything he's had before. Positional changes wouldn't help but moving wrong would bring the pain back to 10/10. He denies emesis and fever. Currently, the pain has abated somewhat. He notes having had an inguinal hernia operation, HCC removal, and history of diverticulitis. He has not had any gallbladder or appendix removal. Kai takes PPI for reflux due to hiatal hernia. He notes that in , he had an alarming kidney values (bad gfr, high calcium, bad creatinine). He was told not to have any kidney stressing medications like NSAIDS. A test 1 month later came back normal. He also notes abnormal results following a dye contrast image.     Independent Historian   None    Review of External Notes       Past Medical History     Medical History and Problem List   Aneurysm of right iliac artery  Anxiety  ASCVD  Bronchitis  Cervical radiculopathy  Chest pain  Abdominal pain  Cirrhosis  Type 2 diabetes  GERD  Hypertension  Hepatic lesion  Hepatitis C  GERD  Hypercalcemia  Liver mass  Neoplasm of liver  Asthma  Diarrhea  Pneumatosis of intestines  RBBB  Renal cyst  Scalp laceration  Chronic kidney disease  Syncope    Medications   Norvasc  Zithromax  Farxiga  Epipen  Erythromycin  Hyzaar  Roxicodone  Protonix  Lyrica  Xanax  Metoprolol  Losartan  Ferosul  Omeprazole  simethicone    Surgical History   Past Surgical History:   Procedure Laterality Date    LIVER SURGERY  2021    Removed a Cancerous Tumor   Hernia repair    lavage  Debridement leg  Tibia lavern nail insertion  Kidney stone surgery  Hepatectomy  Colonoscopy  CVL coronary angiogram  Endoscopy    Physical Exam     Patient Vitals for the past 24 hrs:   BP Temp Temp src Pulse Resp SpO2   09/26/24 1824 135/80 97.8  F (36.6  C) Temporal 85 18 96 %     Physical Exam  HENT:      Head: Normocephalic.   Eyes:      General: No scleral icterus.  Cardiovascular:      Rate and Rhythm: Normal rate and regular rhythm.   Pulmonary:      Effort: Pulmonary effort is normal.   Abdominal:      Palpations: Abdomen is soft.      Tenderness: There is abdominal tenderness in the right upper quadrant, right lower quadrant and periumbilical area.      Hernia: No hernia is present.   Skin:     General: Skin is warm.      Capillary Refill: Capillary refill takes less than 2 seconds.   Neurological:      General: No focal deficit present.      Mental Status: He is alert.   Psychiatric:         Mood and Affect: Mood normal.           Diagnostics     Lab Results   Labs Ordered and Resulted from Time of ED Arrival to Time of ED Departure   COMPREHENSIVE METABOLIC PANEL - Abnormal       Result Value    Sodium 141      Potassium 4.1      Carbon Dioxide (CO2) 28      Anion Gap 8      Urea Nitrogen 20.8      Creatinine 1.28 (*)     GFR Estimate 61      Calcium 9.6      Chloride 105      Glucose 101 (*)     Alkaline Phosphatase 85      AST 21      ALT 17      Protein Total 7.3      Albumin 4.2      Bilirubin Total 0.5     LIPASE - Abnormal    Lipase 91 (*)    LACTIC ACID WHOLE BLOOD WITH 1X REPEAT IN 2 HR WHEN >2 - Normal    Lactic Acid, Initial 1.1     CRP INFLAMMATION - Normal    CRP Inflammation 3.50     CBC WITH PLATELETS AND DIFFERENTIAL    WBC Count 8.5      RBC Count 4.99      Hemoglobin 16.0      Hematocrit 47.2      MCV 95      MCH 32.1      MCHC 33.9      RDW 12.8      Platelet Count 187      % Neutrophils 70      % Lymphocytes 16      % Monocytes 9      % Eosinophils 3      % Basophils 1      %  Immature Granulocytes 1      NRBCs per 100 WBC 0      Absolute Neutrophils 5.9      Absolute Lymphocytes 1.4      Absolute Monocytes 0.8      Absolute Eosinophils 0.3      Absolute Basophils 0.1      Absolute Immature Granulocytes 0.1      Absolute NRBCs 0.0         Imaging   CT Abdomen Pelvis w/o Contrast   Final Result   IMPRESSION:    1.  No acute abnormality in the abdomen or pelvis. No cause for right-sided abdominal pain is identified.   2.  There are a few nonobstructing stones in the left kidney, with the largest measuring 0.4 cm.   3.  Multiple ill-defined hyperdense lesions in both kidneys may represent hemorrhagic or proteinaceous renal cysts, although solid renal neoplasm cannot be excluded on the basis of today's noncontrast exam. Consider nonemergent renal ultrasound or MRI to    confirm the cystic nature of these findings   4.  Sigmoid diverticulosis, without evidence for diverticulitis.   5.  Prostate gland is enlarged.   6.  Aneurysmal dilatation of the right common iliac artery measures 2.4 cm.         Report per radiology.    EKG   None    Independent Interpretation   I independently interpreted the abdominal CT and noted no acute findings.     ED Course      Medications Administered   Medications - No data to display    Procedures   Procedures     Discussion of Management   None    ED Course   ED Course as of 09/26/24 2130   Thu Sep 26, 2024   1853 I obtained history and examined the patient as noted above.   2129 I re-evaluated patient.I explained findings to the patient and we discussed plan for discharge. The patient is comfortable with this plan.         Additional Documentation  None    Medical Decision Making / Diagnosis     CMS Diagnoses: None    MIPS       None    [facility]   Paulo Her is a 69 year old male presents with acute right-sided abdominal pain.  Started suddenly today.  Feels like he got stabbed and was worse on the onset of pain and now improved.  Did consider biliary colic due  to location of pain seemed lower than his gallbladder.  Lab work unremarkable.  Offered CT scan due to age for full assessment.  Unable to use IV dye due to patient's concerns for prior contrast-induced nephropathy but CT noncontrast does not identify acute problem.  Did consider ultrasound of the gallbladder but due to length of stay and patient's improvement recommend follow-up as an outpatient.  Patient was discharged home in stable condition.    Disposition   The patient was discharged.     Diagnosis     ICD-10-CM    1. Right sided abdominal pain  R10.9            Discharge Medications   New Prescriptions    No medications on file         Scribe Disclosure:  Dain PAN, am serving as a scribe at 6:53 PM on 9/26/2024 to document services personally performed by Jong Gamble MD, based on my observations and the provider's statements to me.        Jong Gamble MD  09/27/24 0044